# Patient Record
Sex: MALE | Race: WHITE | NOT HISPANIC OR LATINO | Employment: FULL TIME | URBAN - NONMETROPOLITAN AREA
[De-identification: names, ages, dates, MRNs, and addresses within clinical notes are randomized per-mention and may not be internally consistent; named-entity substitution may affect disease eponyms.]

---

## 2023-04-21 ENCOUNTER — HOSPITAL ENCOUNTER (EMERGENCY)
Facility: HOSPITAL | Age: 23
Discharge: HOME | End: 2023-04-21
Attending: EMERGENCY MEDICINE
Payer: COMMERCIAL

## 2023-04-21 VITALS
DIASTOLIC BLOOD PRESSURE: 79 MMHG | HEART RATE: 82 BPM | RESPIRATION RATE: 18 BRPM | BODY MASS INDEX: 25.2 KG/M2 | HEIGHT: 71 IN | TEMPERATURE: 98 F | OXYGEN SATURATION: 98 % | SYSTOLIC BLOOD PRESSURE: 130 MMHG | WEIGHT: 180 LBS

## 2023-04-21 DIAGNOSIS — F32.A DEPRESSION, UNSPECIFIED DEPRESSION TYPE: Primary | ICD-10-CM

## 2023-04-21 PROCEDURE — 99283 EMERGENCY DEPT VISIT LOW MDM: CPT

## 2023-04-21 NOTE — CASE MANAGEMENT/SOCIAL WORK
Case Management/Social Work    Patient Name:  Jeremy Leger  YOB: 2000  MRN: [id number]  Admit Date:  4/21/2023        RAFY received call from  Ximena who states pt needs transportation back to [facility]. RAFY contacted [employer]. RAFY met with pt outside to provide voucher who states he no longer needs as father is coming to transport. RAFY contacted [employer] back.       Electronically signed by:  MARYJANE Herrera  04/21/23 14:53 EDT

## 2023-04-21 NOTE — ED PROVIDER NOTES
"Subjective:  Chief Complaint:  Depression    History of Present Illness:  Patient is a 23-year-old male presenting to the ER with complaints of depression.  Patient denies active suicidal ideations and states that he would just like resources for depression.  He has been self harming by cutting.  He denies current suicidal ideations but states he has had thoughts in the past.  Patient is currently considering transitioning from male to female and reportedly has had a lot of shame and guilt about this.  This is contributing to his depression.  He denies additional symptoms or complaints at this time.      Nurses Notes reviewed and agree, including vitals, allergies, social history and prior medical history.     REVIEW OF SYSTEMS: All systems reviewed and not pertinent unless noted.  Review of Systems   Psychiatric/Behavioral: Positive for dysphoric mood.   All other systems reviewed and are negative.      History reviewed. No pertinent past medical history.    Allergies:    Patient has no known allergies.      History reviewed. No pertinent surgical history.      Social History     Socioeconomic History   • Marital status: Single         History reviewed. No pertinent family history.    Objective  Physical Exam:  /79 (BP Location: Right arm, Patient Position: Sitting)   Pulse 82   Temp 98 °F (36.7 °C) (Oral)   Resp 18   Ht 180.3 cm (71\")   Wt 81.6 kg (180 lb)   SpO2 98%   BMI 25.10 kg/m²      Physical Exam  Vitals and nursing note reviewed.   Constitutional:       General: He is not in acute distress.     Appearance: He is not toxic-appearing.   HENT:      Head: Normocephalic and atraumatic.      Right Ear: External ear normal.      Left Ear: External ear normal.      Nose: Nose normal.   Eyes:      Extraocular Movements: Extraocular movements intact.      Conjunctiva/sclera: Conjunctivae normal.   Cardiovascular:      Rate and Rhythm: Normal rate.      Heart sounds: Normal heart sounds.   Pulmonary:     "  Effort: Pulmonary effort is normal. No respiratory distress.      Breath sounds: Normal breath sounds.   Abdominal:      General: There is no distension.      Palpations: Abdomen is soft.   Musculoskeletal:         General: Normal range of motion.      Cervical back: Normal range of motion and neck supple.   Skin:     General: Skin is warm and dry.   Neurological:      General: No focal deficit present.      Mental Status: He is alert and oriented to person, place, and time.   Psychiatric:         Attention and Perception: Attention normal.         Mood and Affect: Mood is depressed.         Behavior: Behavior is cooperative.         Procedures    ED Course:         Lab Results (last 24 hours)     ** No results found for the last 24 hours. **           No radiology results from the last 24 hrs       MDM  Patient was evaluated in the ER for increased depression.  Patient is hemodynamically stable nontoxic-appearing on exam.  He has no medical complaints at this time.  Patient denying active suicidal ideations though does report self harming by cutting.  Differential diagnosis includes but is not limited to major depressive disorder, bipolar disorder, among others.  He was evaluated by behavioral health who have given him resources and have him an appointment set up with the psychiatrist on Thursday of next week.  They state that they have safety planned with the patient and he is agreeable with plan for discharge.  Patient has been given return precautions for any new or worsening symptoms or acute concerns.      Final diagnoses:   Depression, unspecified depression type        Sarah Beth Cardoza PA-C  04/21/23 1427

## 2023-04-21 NOTE — ED NOTES
consulted patient, safety planned and provided crisis resources.  Established outpatient follow up for Thursday, 4/27 with Dr. Rafi MD at [facility].  Verbal consent to update mother, Mireille George of disposition.  Will refer to therapy as well.  KARI Burleson agreeable to disposition plan.  MARGIE Dorsey updated.  Serena [facility] RAFY providing transportation support upon discharge.    -Ximena Rose, [facility]     Ximena Rose, [facility]  04/21/23 1426

## 2023-04-21 NOTE — CONSULTS
Jeremy Leger  2000    Preferred Pronouns: ***  Race/Ethnicity: {ED SANE RACE:10088}  Martial Status: {Marital Status:3598306679}  Guardian Name/Contact/etc: ***  Pt Lives With:  ***  Occupation: {Occupation:24751}  Appearance: {APPEARANCE:8559639028}       Time Called for Assessment: ***    Assessment Start and End: ***    Orientation: {Orientation:540675}     Is patient agreeable to treatment? {YES/NO:82445}    Attention and Cooperation: {Attention - R} and {Cooperation:407891742}    Presenting Problems: (How is the patient a danger to self or others and/or what is the chief compliant/concern?) ***    Mood: {Exam; psychiatric mood:81817}    Affect: {Affect:196644637}    Speech: {Speech:019493877}    Eye Contact: {Eye Contact:76771}    Psychomotor Movement: {Psychomotor Behavior:03703}    Depression: {NUMBERS 1-10:89816}     Anxiety: {NUMBERS 1-10:78515}    Sleep: {Sleep:421961241}     Appetite: {Appetite:075210240}     Delusions: { Delusions:42696886}     Hallucinations: {Hallucinations:921775134}     Homicidal Ideations: {Homicidal Ideation:26127}     Current Stressors: {changes/losses/stressors:31806}     Housing Instability and/or Utility Needs: {YES-DESCRIBE/NO:43094}    Food Insecurity: {YES-DESCRIBE/NO:51879}    Transportation Needs: {YES-DESCRIBE/NO:19889}    Established/Current Mental Healthcare/Services: *** (If yes, list therapist(s) services, groups, etc)    Current Psychiatric Medications: *** (If yes, explain/list.  Who manages meds?  Any recent med changes?)    Hx of Psychiatric or Detox Hospitalizations:  {YES-DESCRIBE/NO:35710}    Most recent inpatient admission: ***      COL[place]BIA-SUICIDE SEVERITY RATING SCALE  Psychiatric Inpatient Setting - Discharge Screener    Ask questions that are bold and underlined Discharge   Ask Questions 1 and 2 YES NO   1) Wish to be Dead:   Person endorses thoughts about a wish to be dead or not alive anymore, or wish to fall asleep and not wake  up.  While you were here in the hospital, have you wished you were dead or wished you could go to sleep and not wake up?     2) Suicidal Thoughts:   General non-specific thoughts of wanting to end one's life/die by suicide, “I've thought about killing myself” without general thoughts of ways to kill oneself/associated methods, intent, or plan.   While you were here in the hospital, have you actually had thoughts about killing yourself?      If YES to 2, ask questions 3, 4, 5, and 6.  If NO to 2, go directly to question 6   3) Suicidal Thoughts with Method (without Specific Plan or Intent to Act):   Person endorses thoughts of suicide and has thought of a least one method during the assessment period. This is different than a specific plan with time, place or method details worked out. “I thought about taking an overdose but I never made a specific plan as to when where or how I would actually do it….and I would never go through with it.”   Have you been thinking about how you might kill yourself?      4) Suicidal Intent (without Specific Plan):   Active suicidal thoughts of killing oneself and patient reports having some intent to act on such thoughts, as opposed to “I have the thoughts but I definitely will not do anything about them.”   Have you had these thoughts and had some intention of acting on them or do you have some intention of acting on them after you leave the hospital?      5) Suicide Intent with Specific Plan:   Thoughts of killing oneself with details of plan fully or partially worked out and person has some intent to carry it out.   Have you started to work out or worked out the details of how to kill yourself either for while you were here in the hospital or for after you leave the hospital? Do you intend to carry out this plan?        6) Suicide Behavior    While you were here in the hospital, have you done anything, started to do anything, or prepared to do anything to end your life?    Examples:  Took pills, cut yourself, tried to hang yourself, took out pills but didn't swallow any because you changed your mind or someone took them from you, collected pills, secured a means of obtaining a gun, gave away valuables, wrote a will or suicide note, etc.       Suicidal: {Suicidal Ideation:64678} (If present, describe frequency of thoughts, patient's perception of impulse control, plan or behavior details, etc)    Previous Attempts: {Suicide Attempts Number - R}    Most Recent Attempt: ***    PSYCHOSOCIAL HISTORY    Highest Level of Education: {misc; education:48139}     Family Hx of Mental Health/Substance Abuse: {YES-DESCRIBE/NO:73639}    Patient Trauma/Abuse History: {Abuse/Trauma:49495}      Does this require reporting: {YES/NO:68780}    Legal History / History of Violence: {Legal & Violence Hx - R}     Experience with Interpersonal Violence: {YES-DESCRIBE/NO:86716}     History of Inappropriate Sexual Behavior: {YES-DESCRIBE/NO:22564}    SUBSTANCE USE HISTORY: ***    (Describe in narrative form.  Hx of KE, dx, frequency of use, in what form/route.  Has patient completed EK tx and if so, what level of care?  Or use chart below)      SUBSTANCE   PRESENT USE  Y/N   AGE @ 1ST USE    ROUTE   HOW MUCH & OFTEN   HOW LONG AT THIS RATE   DATE/AMOUNT OF LAST USE   Nicotine         Alcohol         Marijuana         Benzos         Neurontin         Methadone         Opiates/ Fentanyl          Cocaine          Heroin         Meth/Ice         Suboxone           Does the patient have history or current MAT/MOUD: {YES-DESCRIBE/NO:72178}    WITHDRAWAL:    Clinical Opiate Withdrawal Scale (COWS)    Resting Pulse Rate: (record beats per minute)  Measured after patient is sitting or lying for one minute    0 pulse rate 80 or below  1 pulse rate 81 - 100  2 pulse rate 101 - 120  4 pulse rate greater than 120    Sweating: over past ½ hour not accounted for by room temperature or patient activity.  0 no report of  chills or flushing  1 subjective report of chills or flushing  2 flushed or observable moistness on face  3 beads of sweat on brow or face  4 sweat streaming off face    Restlessness: Observation during assessment  0 able to sit still  1 reports difficulty sitting still, but is able to do so  3 frequent shifting or extraneous movements of legs/arms  5 Unable to sit still for more than a few seconds    Pupil size  0 pupils pin point or normal size for room light  1 pupils possibly larger than normal for room light  2 pupils moderately dilated  5 pupils so dilated that only the rim of the iris is visible      Bone or Joint aches if patient was having pain previously, only the additional component attributed to opiates withdrawal is scored  0 not present  1 mild diffuse discomfort  2 patient reports severe diffuse aching of joints/muscles  4 patient is rubbing joints or muscles and is unable to sit still because     of discomfort        Runny nose or tearing Not accounted for by cold symptoms or allergies  0 not present  1 nasal stuffiness or unusually moist eyes  2 nose running or tearing  4 nose constantly running or tears streaming down checks    GI Upset: over last ½ hour  0 no GI symptoms  1 stomach cramps  2 nausea or loose stool  3 vomiting or diarrhea  5 Multiple episodes of diarrhea or vomiting    Tremor observation of outstretched hands  0 No tremor  1 tremor can be felt, but no observed  2 slight tremor observable  4 gross tremor or muscle twitching    Yawning Observation during assessment  0 no yawning  1 yawning once or twice during assessment  2 yawning three or more times during assessment  4 yawning several times/minute    Anxiety or Irritability  0 none  1 patient reports increasing irritability or anxiousness  2 patient obviously irritable anxious  4 patient so irritable or anxious that participation in the assessment is difficult    Gooseflesh skin  0 skin is smooth  3 piloerrection of skin can be  felt or hairs standing up on arms  5 prominent piloerrection                                                                                        Total scores  with observer's initials          Clinical [place] Withdrawal Assessment of Alcohol Scale (CIWA)    Pulse or heart rate, taken for one minute: Blood pressure:    NAUSEA AND VOMITING--Ask “Do you fell sick to your stomach? Have you vomited?” Observatoin.  0 no nausea and no vomiting  1 mild nausea with no vomiting  2  3  4 intermittent nausea with dry heaves  5  6  7 constant nausea, frequent dry heaves and vomiting    TREMOR--Arms extended and fingers spread apart.  Observation  0 no tremor  1 not visible, but can be felt fingertip to fingertip  2  3  4 moderate, with patient's arms extended  5  6  7 severe, even with arms not extended    PAROXYSMAL SWEATS--Observation  0 no sweat visible  1 barely perceptible sweating, palms moist  2  3  4 beads of sweat obvious on forehead  5  6  7 drenching sweats    ANXIETY--Ask “Do you feel nervous?” Observation.  0 no anxiety, at ease  1 mild anxious  2  3  4 moderately anxious, or guarded, so anxiety is inferred  5  6  7 equivalent to acute panic states as seen in severe delirium or acute schizophrenic reactions    TACTILE DISTURBANCES--Ask “Have you any itching, pins and needles sensations, any burning, any numbness, or do you feel bugs crawling on or under your skin?” Observation.  0 none  1 very mild itching, pins and needles, burning or numbness  2 mild itching, pins and needles, burning or numbness  3 moderate itching, pins and needles, burning or numbness  4 moderately severe hallucinations  5 severe hallucinations  6 extremely severe hallucinations  7 continuous hallucinations  AUDITORY DISTURBANCES--Ask “Are you more aware of sounds around you ? Are they harsh? Do they frighten you?  Are you hearing anything that is disturbing to you?  Are you hearing things you know are not there? Observation.  0 not  present  1 very mild harshness or ability to frighten  2 mild harshness or ability to frighten  3 moderate harshness or ability to frighten  4 moderately severe hallucinations  5 severe hallucinations  6 extremely severe hallucinations  7 continuous hallucinations       VISUAL DISTURBANCES--Ask “Does the light appear to be too bright? Is its color different? Does it hurt your eyes?  Are you seeing anything that is disturbing to you? Are you seeing things you know are not there?' Observation  0 not present  1 very mild sensitivity  2 mild sensitivity  3 moderate sensitivity  4 moderately severe hallucinations  5 severe hallucinations  6 extremely severe hallucinations  7 continuous hallucinations    HEADACHE, FULLNESS IN HEAD--Ask “Does your head feel different? Does it feel like there is a band around your head?” Do not rate for dizziness or lightheadedness.  Otherwise, rate severity.  0 not present  1 very mild  2 mild  3 moderate  4 moderately severe  5 severe  6 very severe  7 extremely severe    AGITATION--Observation  0 normal activity  1 somewhat more than normal activity  2   3  4 moderately fidgety and restless  5  6  7 paces back and forth during most of the interview, or constantly thrashes about    ORIENTATION AND CLOUDING OF SENSORIUM--Ask   “What day is this? Where are you? Who am I?  0 oriented and can do serial additions  1 cannot do serial additions or is uncertain about date  2 disoriented for date by no more than 2 calendar days  3 disoriented for date by more than 2 calendar days  4 disoriented for place/or person        Total CIWA-Ar Score:  Rater's Initials:    History of DT's: {YES-DESCRIBE/NO:15472}    History of Seizures: {YES-DESCRIBE/NO:24614}    Recovery Environment: ***    Current Medical Conditions or Biomedical Complications: {YES/NO:65336} (If yes, explain/list)      DATA:   This therapist received a call from [facility] staff for a behavioral health consult.  The patient is  agreeable to speak with the behavioral health team.  Met with patient at bedside. Patient {is/is not:72528} under 1:1 security monitoring.  The attending treatment team is ***, RN and ***, Provider.    Patient presents today with chief compliant of {Chief Complaint:40543}.      *** Summarize brief, supporting clinical data here that explains situation and recommendation for level of care.***    Safety plan of report to nearest hospital, or call police/911 if feeling unsafe, if having suicidal or homicidal thoughts, or if in emergent need of medications verbally reviewed with patient during assessment and suicide prevention/crisis hotlines verbally reviewed with patient during assessment.  Patient during assessment verbally agreed to safety plan. Patient reports to be agreeable for treatment recommendations.     ASSESSMENT:    Therapist consulted with patient and clinical descriptors are documented above.  Therapist completed CSSRS with patient for suicide risk assessment.  The results of patient’s CSSRS documented above. The patient's displays {Good Fair Poor:21539} insight, {Good Fair Poor:05457} impulse control and judgement appears {insight/judgement:31881}.     PLAN:    At this time, therapist recommends {inpatient / outpatient:20114} treatment based upon the above assessment.  Therapist collaborated with the treatment team (***, RN and ***, Provider) who agree to adopt the recommendations.  Therapist discussed recommendations with patient and/or patient support systems, and patient is agreeable to the plan. Although the patient has potential to benefit from the inpatient level of care, the patient is not agreeable for an acute admission at this time.  Patient does not present with criteria to warrant an involuntary psychiatric hold at this time as they are not endorsing active suicidal ideation with plan/intent, homicidal ideation with plan/intent or displaying symptoms of an acute psychotic episode without  ability to appropriately plan for safety at a lesser restrictive level of care.  The patient identified proactive factors and is agreeable to establish/engage in outpatient behavioral health services.  Therapist provided resources for outpatient services and discussed the availability of emergency behavioral health services 24/7 through the [facility], [place], operated by [facility].  Assisted patient in identifying risk factors that would indicate the need for higher level of care, such as thoughts to harm self or others and/or self-harming behavior(s). Encouraged patient to call 911, crisis hotlines, or present to the nearest emergency department should symptoms worsen, or in any crisis/emergency. Patient agreeable and voiced understanding.       Ximena Rose, [facility]

## 2023-04-21 NOTE — DISCHARGE INSTRUCTIONS
Follow-up for your appointment with behavioral health on Thursday of next week.  Follow-up with your PCP as needed.  Return to the ER for any new or worsening symptoms or acute concerns.

## 2023-04-21 NOTE — ED NOTES
"Pt denies and current SI thoughts and states that he would like to just have resources for his depression. Pt states he has a hx of \"cutting\" but has not attempted anything else but did have thoughts in the past. Per Charge MARGIE Martinez,  is to be contacted at this time.   "